# Patient Record
(demographics unavailable — no encounter records)

---

## 2024-11-12 NOTE — REVIEW OF SYSTEMS
[Fatigue: Grade 1 - Fatigue relieved by rest] : Fatigue: Grade 1 - Fatigue relieved by rest [Urinary Incontinence: Grade 0] : Urinary Incontinence: Grade 0  [Urinary Retention: Grade 0] : Urinary Retention: Grade 0 [Urinary Urgency: Grade 0] : Urinary Urgency: Grade 0 [Urinary Frequency: Grade 0] : Urinary Frequency: Grade 0

## 2024-11-12 NOTE — DISEASE MANAGEMENT
[Clinical] : TNM Stage: c [I] : I [TTNM] : 1 [NTNM] : 0 [MTNM] : 0 [de-identified] : 1000 [de-identified] : 6438 [de-identified] : prostate

## 2024-11-12 NOTE — HISTORY OF PRESENT ILLNESS
[FreeTextEntry1] : 11/12/2024 Mr. Vargas presents today for his OTV.  He completed 4/28 fractions to the prostate.  He is doing well with Tx so far with no dysuria.  He currently has nocturia x2.   Yes

## 2024-11-20 NOTE — PHYSICAL EXAM
[] : no respiratory distress [Exaggerated Use Of Accessory Muscles For Inspiration] : no accessory muscle use [Nondistended] : nondistended [Normal] : oriented to person, place and time, the affect was normal, the mood was normal and not anxious

## 2024-11-20 NOTE — HISTORY OF PRESENT ILLNESS
[FreeTextEntry1] : 11/12/2024 Mr. Vargas presents today for his OTV.  He completed 4/28 fractions to the prostate.  He is doing well with Tx so far with no dysuria.  He currently has nocturia x2.    11/19/2024 Mr. Vargas presents today for his OTV.  He completed 9/28 fractions to the prostate. Patient reports nocturia x 2, denies hematuria, dysuria, urgency and bowel dysfunction.

## 2024-11-20 NOTE — DISEASE MANAGEMENT
[Clinical] : TNM Stage: c [I] : I [TTNM] : 1 [NTNM] : 0 [MTNM] : 0 [de-identified] : 8263 [de-identified] : 0121 [de-identified] : prostate

## 2024-11-20 NOTE — REVIEW OF SYSTEMS
[Fatigue: Grade 1 - Fatigue relieved by rest] : Fatigue: Grade 1 - Fatigue relieved by rest [Urinary Incontinence: Grade 0] : Urinary Incontinence: Grade 0  [Urinary Retention: Grade 0] : Urinary Retention: Grade 0 [Urinary Tract Pain: Grade 0] : Urinary Tract Pain: Grade 0 [Urinary Urgency: Grade 0] : Urinary Urgency: Grade 0 [Urinary Frequency: Grade 1 - Present] : Urinary Frequency: Grade 1 - Present [Diarrhea: Grade 0] : Diarrhea: Grade 0 [Proctitis: Grade 0] : Proctitis: Grade 0

## 2024-11-20 NOTE — DISEASE MANAGEMENT
[Clinical] : TNM Stage: c [I] : I [TTNM] : 1 [NTNM] : 0 [MTNM] : 0 [de-identified] : 3548 [de-identified] : 1512 [de-identified] : prostate

## 2024-11-26 NOTE — REVIEW OF SYSTEMS
[Constipation: Grade 0] : Constipation: Grade 0 [Diarrhea: Grade 0] : Diarrhea: Grade 0 [Rectal Pain: Grade 0] : Rectal Pain: Grade 0 [Fatigue: Grade 0] : Fatigue: Grade 0 [Urinary Retention: Grade 0] : Urinary Retention: Grade 0 [Urinary Urgency: Grade 0] : Urinary Urgency: Grade 0 [Urinary Frequency: Grade 0] : Urinary Frequency: Grade 0

## 2024-11-26 NOTE — DISEASE MANAGEMENT
[Clinical] : TNM Stage: c [I] : I [TTNM] : 1 [NTNM] : 0 [MTNM] : 0 [de-identified] : 9728 [de-identified] : 5802 [de-identified] : prostate

## 2024-11-26 NOTE — HISTORY OF PRESENT ILLNESS
[FreeTextEntry1] : 11/12/2024 Mr. Vargas presents today for his OTV.  He completed 4/28 fractions to the prostate.  He is doing well with Tx so far with no dysuria.  He currently has nocturia x2.    11/19/2024 Mr. Vargas presents today for his OTV.  He completed 9/28 fractions to the prostate. Patient reports nocturia x 2, denies hematuria, dysuria, urgency and bowel dysfunction.  11/26/24 FX 15 Doing well with treatment.  he reports nocturia x3 but this is unchanged since starting RT.  He denies any dysuria or bowel issues.  His appetite remains good

## 2024-12-03 NOTE — REVIEW OF SYSTEMS
[Fatigue: Grade 1 - Fatigue relieved by rest] : Fatigue: Grade 1 - Fatigue relieved by rest [Urinary Urgency: Grade 0] : Urinary Urgency: Grade 0 [Urinary Frequency: Grade 1 - Present] : Urinary Frequency: Grade 1 - Present

## 2024-12-04 NOTE — HISTORY OF PRESENT ILLNESS
[FreeTextEntry1] : 11/12/2024 Mr. Vargas presents today for his OTV.  He completed 4/28 fractions to the prostate.  He is doing well with Tx so far with no dysuria.  He currently has nocturia x2.    11/19/2024 Mr. Vargas presents today for his OTV.  He completed 9/28 fractions to the prostate. Patient reports nocturia x 2, denies hematuria, dysuria, urgency and bowel dysfunction.  11/26/24 FX 15 Doing well with treatment.  he reports nocturia x3 but this is unchanged since starting RT.  He denies any dysuria or bowel issues.  His appetite remains good.  12/3/204 Mr. Vargas presents today for his OTV.  He completed 18/28 fractions to the prostate.  He is having some urinary frequency and nocturia x2. No bowel issues reported His appetite has been good

## 2024-12-04 NOTE — DISEASE MANAGEMENT
[Clinical] : TNM Stage: c [I] : I [TTNM] : 1 [NTNM] : 0 [MTNM] : 0 [de-identified] : 5010 [de-identified] : 8829 [de-identified] : prostate

## 2024-12-04 NOTE — DISEASE MANAGEMENT
[Clinical] : TNM Stage: c [I] : I [TTNM] : 1 [NTNM] : 0 [MTNM] : 0 [de-identified] : 4708 [de-identified] : 4944 [de-identified] : prostate

## 2024-12-10 NOTE — REVIEW OF SYSTEMS
[Fatigue: Grade 1 - Fatigue relieved by rest] : Fatigue: Grade 1 - Fatigue relieved by rest [Hematuria: Grade 0] : Hematuria: Grade 0 [Urinary Incontinence: Grade 0] : Urinary Incontinence: Grade 0  [Urinary Retention: Grade 0] : Urinary Retention: Grade 0 [Urinary Tract Pain: Grade 0] : Urinary Tract Pain: Grade 0 [Urinary Urgency: Grade 1 - Present] : Urinary Urgency: Grade 1 - Present [Urinary Frequency: Grade 0] : Urinary Frequency: Grade 0

## 2024-12-12 NOTE — DISEASE MANAGEMENT
[Clinical] : TNM Stage: c [I] : I [TTNM] : 1 [NTNM] : 0 [MTNM] : 0 [de-identified] : 1005 [de-identified] : 3239 [de-identified] : prostate

## 2024-12-12 NOTE — HISTORY OF PRESENT ILLNESS
[FreeTextEntry1] : 11/12/2024 Mr. Vargas presents today for his OTV.  He completed 4/28 fractions to the prostate.  He is doing well with Tx so far with no dysuria.  He currently has nocturia x2.    11/19/2024 Mr. Vargas presents today for his OTV.  He completed 9/28 fractions to the prostate. Patient reports nocturia x 2, denies hematuria, dysuria, urgency and bowel dysfunction.  11/26/24 FX 15 Doing well with treatment.  he reports nocturia x3 but this is unchanged since starting RT.  He denies any dysuria or bowel issues.  His appetite remains good.  12/3/204 Mr. Vargas presents today for his OTV.  He completed 18/28 fractions to the prostate.  He is having some urinary frequency and nocturia x2. No bowel issues reported His appetite has been good   12/10/24 FX 23 doing well with TX.  he has nocturia x3 but no dysuria .  His bowels are fine.  His appetite has been good and he had no other new complaints.

## 2024-12-12 NOTE — DISEASE MANAGEMENT
[Clinical] : TNM Stage: c [I] : I [TTNM] : 1 [NTNM] : 0 [MTNM] : 0 [de-identified] : 6765 [de-identified] : 4892 [de-identified] : prostate

## 2024-12-16 NOTE — REVIEW OF SYSTEMS
[Constipation: Grade 0] : Constipation: Grade 0 [Diarrhea: Grade 0] : Diarrhea: Grade 0 [Proctitis: Grade 0] : Proctitis: Grade 0 [Rectal Pain: Grade 0] : Rectal Pain: Grade 0 [Fatigue: Grade 1 - Fatigue relieved by rest] : Fatigue: Grade 1 - Fatigue relieved by rest [Urinary Urgency: Grade 0] : Urinary Urgency: Grade 0 [Urinary Frequency: Grade 0] : Urinary Frequency: Grade 0

## 2024-12-16 NOTE — HISTORY OF PRESENT ILLNESS
[FreeTextEntry1] : 11/12/2024 Mr. Vargas presents today for his OTV.  He completed 4/28 fractions to the prostate.  He is doing well with Tx so far with no dysuria.  He currently has nocturia x2.    11/19/2024 Mr. Vargas presents today for his OTV.  He completed 9/28 fractions to the prostate. Patient reports nocturia x 2, denies hematuria, dysuria, urgency and bowel dysfunction.  11/26/24 FX 15 Doing well with treatment.  he reports nocturia x3 but this is unchanged since starting RT.  He denies any dysuria or bowel issues.  His appetite remains good.  12/3/204 Mr. Vargas presents today for his OTV.  He completed 18/28 fractions to the prostate.  He is having some urinary frequency and nocturia x2. No bowel issues reported His appetite has been good   12/10/24 FX 23 doing well with TX.  he has nocturia x3 but no dysuria .  His bowels are fine.  His appetite has been good and he had no other new complaints.   12/16/24 FX 27/28 Continues to do very well with TX.  Denies any bowel or bladder issues. No nocturia reported.  His appetite remains good and he denies any pain or discomfort.

## 2024-12-16 NOTE — DISEASE MANAGEMENT
[Clinical] : TNM Stage: c [I] : I [TTNM] : 1 [NTNM] : 0 [MTNM] : 0 [de-identified] : 9775 [de-identified] : 5017 [de-identified] : prostate

## 2025-01-16 NOTE — REASON FOR VISIT
[Post-Treatment Evaluation] : post-treatment evaluation for [Prostate Cancer] : prostate cancer [Spouse] : spouse

## 2025-01-16 NOTE — HISTORY OF PRESENT ILLNESS
[FreeTextEntry1] : Mr. Vargas is a 76-year-old male with newly diagnosed unfavorable intermediate risk prostate cancer with a Burke Score of 4+3=7; pretreatment PSA of 6 ng/ml.  Urologist: Dr. Leonardo   PSA Trend:  1/10/23: 4.5 ng/ml  6/12/24: 6 ng/ml   The patient has deferred the MRI due to that he has a pacemaker ([employer]tr[employer])   8/7/24 Prostate Biopsy ([facility])  1. Left mid - adenocarcinoma of the prostate, GG2 [place] Score 3+4=7, involving 60% of tissues in 2/2 cores  2. Left apex - adenocarcinoma of the prostate, GG3 [place] Score 4+3=7, involving 50% of tissues in 2/2 cores   8/30/24 Patient presents today to discuss treatment with radiation.  He is overall feeling well. He does deny any urinary symptoms. He gets up very infrequently at night to urinate. Bowels are regular and appetite is good.  AUA-2 EPIC-CP- 11  9/9/2024 PET Scan ([facility]):  1. PSMA-avid left prostate lesion, with PSMA expression score 3, high expression. No PSMA-avid lymphadenopathy or distant disease. 2. Similar right middle lobe lung nodule compared with CT 12/25/2017. As this is incompletely characterized on nonbreath held CT, recommend diagnostic quality CT for most complete evaluation. This shows mild radiotracer avidity, which is nonspecific.  01/27/25 Mr. Vargas presents today for PTE s/p 28 fractions of RT to the prostate/SV to a dose of 7000 cGy completed on 12/17/24. PSA? Symptoms? Urologist?

## 2025-01-28 NOTE — DISEASE MANAGEMENT
[TTNM] : 1 [NTNM] : 0 [MTNM] : 0 [de-identified] : 3127 [de-identified] : 0211 [de-identified] : prostate/SV - 28 fractions completed on 12/17/24

## 2025-01-28 NOTE — HISTORY OF PRESENT ILLNESS
[FreeTextEntry1] : Mr. Vargas is a 76-year-old male with unfavorable intermediate, clinical stage T1c risk prostate cancer with a Burke Score of 4+3=7; pretreatment PSA of 6 ng/ml.  Urologist: Dr. Leonardo   PSA Trend:  01/10/23: 4.5 ng/ml  06/12/24: 6 ng/ml 01/21/25: 0.05 ng/ml  The patient has deferred the MRI due to that he has a pacemaker ([employer]tronic)   8/7/24 Prostate Biopsy ([facility])  1. Left mid - adenocarcinoma of the prostate, GG2 [place] Score 3+4=7, involving 60% of tissues in 2/2 cores  2. Left apex - adenocarcinoma of the prostate, GG3 [place] Score 4+3=7, involving 50% of tissues in 2/2 cores   8/30/24 Patient presents today to discuss treatment with radiation.  He is overall feeling well. He does deny any urinary symptoms. He gets up very infrequently at night to urinate. Bowels are regular and appetite is good.  AUA-2 EPIC-CP- 11  9/9/2024 PET Scan ([facility]):  1. PSMA-avid left prostate lesion, with PSMA expression score 3, high expression. No PSMA-avid lymphadenopathy or distant disease. 2. Similar right middle lobe lung nodule compared with CT 12/25/2017. As this is incompletely characterized on nonbreath held CT, recommend diagnostic quality CT for most complete evaluation. This shows mild radiotracer avidity, which is nonspecific.  01/27/25 Mr. Vargas presents today for PTE s/p 28 fractions of RT to the prostate/SV to a dose of 7000 cGy completed on 12/17/24. His post treatment PSA on 1/21/25 was 0.05 ng/ml. Symptoms? Urologist?

## 2025-01-28 NOTE — HISTORY OF PRESENT ILLNESS
[FreeTextEntry1] : Mr. Vargas is a 76-year-old male with unfavorable intermediate, clinical stage T1c risk prostate cancer with a Burke Score of 4+3=7; pretreatment PSA of 6 ng/ml.  Urologist: Dr. Leonardo   PSA Trend:  01/10/23: 4.5 ng/ml  06/12/24: 6 ng/ml 01/21/25: 0.05 ng/ml  The patient has deferred the MRI due to that he has a pacemaker ([employer]tronic)   8/7/24 Prostate Biopsy ([facility])  1. Left mid - adenocarcinoma of the prostate, GG2 [place] Score 3+4=7, involving 60% of tissues in 2/2 cores  2. Left apex - adenocarcinoma of the prostate, GG3 [place] Score 4+3=7, involving 50% of tissues in 2/2 cores   8/30/24 Patient presents today to discuss treatment with radiation.  He is overall feeling well. He does deny any urinary symptoms. He gets up very infrequently at night to urinate. Bowels are regular and appetite is good.  AUA-2 EPIC-CP- 11  9/9/2024 PET Scan ([facility]):  1. PSMA-avid left prostate lesion, with PSMA expression score 3, high expression. No PSMA-avid lymphadenopathy or distant disease. 2. Similar right middle lobe lung nodule compared with CT 12/25/2017. As this is incompletely characterized on nonbreath held CT, recommend diagnostic quality CT for most complete evaluation. This shows mild radiotracer avidity, which is nonspecific.  01/27/25 Mr. Vargas presents today for PTE s/p 28 fractions of RT to the prostate/SV to a dose of 7000 cGy completed on 12/17/24. His post treatment PSA on 1/21/25 was 0.05 ng/ml. Symptoms? Urologist?

## 2025-01-28 NOTE — HISTORY OF PRESENT ILLNESS
[FreeTextEntry1] : Mr. Vargas is a 76-year-old male with unfavorable intermediate, clinical stage T1c risk prostate cancer with a Burke Score of 4+3=7; pretreatment PSA of 6 ng/ml.  Urologist: Dr. Leonardo   PSA Trend:  01/10/23: 4.5 ng/ml  06/12/24: 6 ng/ml 01/21/25: 0.05 ng/ml  The patient has deferred the MRI due to that he has a pacemaker ([employer]tronic)   8/7/24 Prostate Biopsy ([facility])  1. Left mid - adenocarcinoma of the prostate, GG2 [place] Score 3+4=7, involving 60% of tissues in 2/2 cores  2. Left apex - adenocarcinoma of the prostate, GG3 [place] Score 4+3=7, involving 50% of tissues in 2/2 cores   8/30/24 Patient presents today to discuss treatment with radiation.  He is overall feeling well. He does deny any urinary symptoms. He gets up very infrequently at night to urinate. Bowels are regular and appetite is good.  AUA-2 EPIC-CP- 11  9/9/2024 PET Scan ([facility]):  1. PSMA-avid left prostate lesion, with PSMA expression score 3, high expression. No PSMA-avid lymphadenopathy or distant disease. 2. Similar right middle lobe lung nodule compared with CT 12/25/2017. As this is incompletely characterized on nonbreath held CT, recommend diagnostic quality CT for most complete evaluation. This shows mild radiotracer avidity, which is nonspecific.  01/27/25 Mr. Vargas presents today for PTE s/p 28 fractions of RT to the prostate/SV to a dose of 7000 cGy completed on 12/17/24. His post treatment PSA on 1/21/25 was 0.05 ng/ml. Symptoms? Urologist?

## 2025-01-28 NOTE — DISEASE MANAGEMENT
[TTNM] : 1 [NTNM] : 0 [MTNM] : 0 [de-identified] : 1947 [de-identified] : 8900 [de-identified] : prostate/SV - 28 fractions completed on 12/17/24

## 2025-01-28 NOTE — DISEASE MANAGEMENT
[TTNM] : 1 [NTNM] : 0 [MTNM] : 0 [de-identified] : 0952 [de-identified] : 1453 [de-identified] : prostate/SV - 28 fractions completed on 12/17/24

## 2025-02-03 NOTE — REVIEW OF SYSTEMS
[Fatigue] : fatigue [Nocturia] : nocturia [Urinary Frequency] : urinary frequency [Muscle Weakness] : muscle weakness [Negative] : Allergic/Immunologic [Constipation: Grade 0] : Constipation: Grade 0 [Diarrhea: Grade 0] : Diarrhea: Grade 0 [Fatigue: Grade 1 - Fatigue relieved by rest] : Fatigue: Grade 1 - Fatigue relieved by rest [Hematuria: Grade 0] : Hematuria: Grade 0 [Urinary Incontinence: Grade 0] : Urinary Incontinence: Grade 0  [Urinary Retention: Grade 0] : Urinary Retention: Grade 0 [Urinary Tract Pain: Grade 0] : Urinary Tract Pain: Grade 0 [Urinary Urgency: Grade 0] : Urinary Urgency: Grade 0 [Urinary Frequency: Grade 1 - Present] : Urinary Frequency: Grade 1 - Present [Chest Pain] : no chest pain [Palpitations] : no palpitations [Lower Ext Edema] : no lower extremity edema [Shortness Of Breath] : no shortness of breath [Cough] : no cough [SOB on Exertion] : no shortness of breath during exertion [Constipation] : no constipation [Diarrhea] : no diarrhea

## 2025-02-03 NOTE — DISEASE MANAGEMENT
[TTNM] : 1 [NTNM] : 0 [MTNM] : 0 [de-identified] : 2888 [de-identified] : 8305 [de-identified] : prostate/SV - 28 fractions completed on 12/17/24

## 2025-02-03 NOTE — DISEASE MANAGEMENT
[TTNM] : 1 [NTNM] : 0 [MTNM] : 0 [de-identified] : 6661 [de-identified] : 5570 [de-identified] : prostate/SV - 28 fractions completed on 12/17/24

## 2025-02-03 NOTE — PHYSICAL EXAM
[Hearing Threshold Finger Rub Not [place]] : hearing was normal [Heart Rate And Rhythm] : heart rate and rhythm were normal [Arterial Pulses Normal] : the arterial pulses were normal [Abdomen Soft] : soft [Nondistended] : nondistended [Normal] : oriented to person, place and time, the affect was normal, the mood was normal and not anxious [FreeTextEntry1] : MILY deferred, patient presents for PTE

## 2025-02-03 NOTE — HISTORY OF PRESENT ILLNESS
[FreeTextEntry1] : Mr. Vargas is a 76-year-old male with unfavorable intermediate, clinical stage T1c risk prostate cancer with a Burke Score of 4+3=7; pretreatment PSA of 6 ng/ml.  Urologist: Dr. Leonardo   PSA Trend:  01/10/23: 4.5 ng/ml  06/12/24: 6 ng/ml 01/21/25: 0.05 ng/ml  The patient has deferred the MRI due to that he has a pacemaker ([employer]tr[employer])   8/7/24 Prostate Biopsy ([facility])  1. Left mid - adenocarcinoma of the prostate, GG2 [place] Score 3+4=7, involving 60% of tissues in 2/2 cores  2. Left apex - adenocarcinoma of the prostate, GG3 [place] Score 4+3=7, involving 50% of tissues in 2/2 cores   8/30/24 Patient presents today to discuss treatment with radiation.  He is overall feeling well. He does deny any urinary symptoms. He gets up very infrequently at night to urinate. Bowels are regular and appetite is good.  AUA-2 EPIC-CP- 11  9/9/2024 PET Scan ([facility]):  1. PSMA-avid left prostate lesion, with PSMA expression score 3, high expression. No PSMA-avid lymphadenopathy or distant disease. 2. Similar right middle lobe lung nodule compared with CT 12/25/2017. As this is incompletely characterized on nonbreath held CT, recommend diagnostic quality CT for most complete evaluation. This shows mild radiotracer avidity, which is nonspecific.  01/27/25 Mr. Vargas presents today for PTE s/p 28 fractions of RT to the prostate/SV to a dose of 7000 cGy completed on 12/17/24. His post treatment PSA on 1/21/25 was 0.05 ng/ml. He received his last injection in December 2024 and saw Dr. Gomez at that time. Patient denies any dysuria, hematuria, urgency, frequency and bowel dysfunction. Patient was admitted in [facility] in January for increased SOB for which cardiac work-up was performed. While undergoing cardiac catheterization, patient became confused. He was seen by inpatient neurology and was recommended to follow-up with an outpatient neurologist whom they saw, pending MRI head. He has a follow-up with his cardiologist later today and will be seeing his PCP in a few days.

## 2025-02-03 NOTE — HISTORY OF PRESENT ILLNESS
[FreeTextEntry1] : Mr. Vargas is a 76-year-old male with unfavorable intermediate, clinical stage T1c risk prostate cancer with a Burke Score of 4+3=7; pretreatment PSA of 6 ng/ml.  Urologist: Dr. Leonardo   PSA Trend:  01/10/23: 4.5 ng/ml  06/12/24: 6 ng/ml 01/21/25: 0.05 ng/ml  The patient has deferred the MRI due to that he has a pacemaker ([employer]tronic)   8/7/24 Prostate Biopsy ([facility])  1. Left mid - adenocarcinoma of the prostate, GG2 [place] Score 3+4=7, involving 60% of tissues in 2/2 cores  2. Left apex - adenocarcinoma of the prostate, GG3 [place] Score 4+3=7, involving 50% of tissues in 2/2 cores   8/30/24 Patient presents today to discuss treatment with radiation.  He is overall feeling well. He does deny any urinary symptoms. He gets up very infrequently at night to urinate. Bowels are regular and appetite is good.  AUA-2 EPIC-CP- 11  9/9/2024 PET Scan ([facility]):  1. PSMA-avid left prostate lesion, with PSMA expression score 3, high expression. No PSMA-avid lymphadenopathy or distant disease. 2. Similar right middle lobe lung nodule compared with CT 12/25/2017. As this is incompletely characterized on nonbreath held CT, recommend diagnostic quality CT for most complete evaluation. This shows mild radiotracer avidity, which is nonspecific.  01/27/25 Mr. Vargas presents today for PTE s/p 28 fractions of RT to the prostate/SV to a dose of 7000 cGy completed on 12/17/24. His post treatment PSA on 1/21/25 was 0.05 ng/ml. He received his last injection in December 2024 and saw Dr. Gomez at that time. Patient denies any dysuria, hematuria, urgency, frequency and bowel dysfunction. Patient was admitted in [facility] in January for increased SOB for which cardiac work-up was performed. While undergoing cardiac catheterization, patient became confused. He was seen by inpatient neurology and was recommended to follow-up with an outpatient neurologist whom they saw, pending MRI head. He has a follow-up with his cardiologist later today and will be seeing his PCP in a few days.

## 2025-02-03 NOTE — PHYSICAL EXAM
[Hearing Threshold Finger Rub Not [place]] : hearing was normal [Heart Rate And Rhythm] : heart rate and rhythm were normal [Arterial Pulses Normal] : the arterial pulses were normal [Abdomen Soft] : soft [Nondistended] : nondistended [Normal] : oriented to person, place and time, the affect was normal, the mood was normal and not anxious [FreeTextEntry1] : MILY deferred, patient presents for PTE